# Patient Record
Sex: MALE | Race: WHITE | Employment: PART TIME | URBAN - METROPOLITAN AREA
[De-identification: names, ages, dates, MRNs, and addresses within clinical notes are randomized per-mention and may not be internally consistent; named-entity substitution may affect disease eponyms.]

---

## 2018-03-28 ENCOUNTER — OFFICE VISIT (OUTPATIENT)
Dept: FAMILY MEDICINE CLINIC | Age: 23
End: 2018-03-28
Payer: COMMERCIAL

## 2018-03-28 VITALS
TEMPERATURE: 98.8 F | OXYGEN SATURATION: 98 % | WEIGHT: 142 LBS | HEART RATE: 82 BPM | BODY MASS INDEX: 22.82 KG/M2 | HEIGHT: 66 IN | RESPIRATION RATE: 16 BRPM | SYSTOLIC BLOOD PRESSURE: 110 MMHG | DIASTOLIC BLOOD PRESSURE: 68 MMHG

## 2018-03-28 DIAGNOSIS — R11.2 NAUSEA VOMITING AND DIARRHEA: Primary | ICD-10-CM

## 2018-03-28 DIAGNOSIS — R19.7 NAUSEA VOMITING AND DIARRHEA: Primary | ICD-10-CM

## 2018-03-28 PROCEDURE — 99213 OFFICE O/P EST LOW 20 MIN: CPT | Performed by: PHYSICIAN ASSISTANT

## 2018-03-28 RX ORDER — ONDANSETRON 4 MG/1
4 TABLET, ORALLY DISINTEGRATING ORAL EVERY 8 HOURS PRN
Qty: 10 TABLET | Refills: 0 | Status: SHIPPED
Start: 2018-03-28 | End: 2021-06-09

## 2018-03-28 RX ORDER — OSELTAMIVIR PHOSPHATE 75 MG/1
75 CAPSULE ORAL 2 TIMES DAILY
Qty: 10 CAPSULE | Refills: 0 | Status: SHIPPED | OUTPATIENT
Start: 2018-03-28 | End: 2018-04-02

## 2021-06-09 ENCOUNTER — OFFICE VISIT (OUTPATIENT)
Dept: ORTHOPEDIC SURGERY | Age: 26
End: 2021-06-09
Payer: COMMERCIAL

## 2021-06-09 VITALS — BODY MASS INDEX: 24.91 KG/M2 | HEIGHT: 66 IN | WEIGHT: 155 LBS

## 2021-06-09 DIAGNOSIS — M25.561 RIGHT KNEE PAIN, UNSPECIFIED CHRONICITY: Primary | ICD-10-CM

## 2021-06-09 PROCEDURE — 99203 OFFICE O/P NEW LOW 30 MIN: CPT | Performed by: ORTHOPAEDIC SURGERY

## 2021-06-09 PROCEDURE — G8419 CALC BMI OUT NRM PARAM NOF/U: HCPCS | Performed by: ORTHOPAEDIC SURGERY

## 2021-06-09 PROCEDURE — G8428 CUR MEDS NOT DOCUMENT: HCPCS | Performed by: ORTHOPAEDIC SURGERY

## 2021-06-09 PROCEDURE — 1036F TOBACCO NON-USER: CPT | Performed by: ORTHOPAEDIC SURGERY

## 2021-06-10 NOTE — PROGRESS NOTES
Chief Complaint:   Chief Complaint   Patient presents with    Knee Pain     Right knee stiffness, some pain. States this knee has bothered him since he was in the 3rd grade on and off. When doing yard work knee will crack. He started weight training, bothers him squatting. Wears knee sleeve. No xrays. HPI 77-year-old male here for evaluation of somewhat chronic right knee anterior stiffness and pain. 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 He is primarily concerned because he recalls he was told as a child that he had a \"bone tumor\" about the right knee but no treatment was recommended. There is no problem list on file for this patient. Past Medical History:   Diagnosis Date    Asthma        Past Surgical History:   Procedure Laterality Date    WISDOM TOOTH EXTRACTION      x 4       Current Outpatient Medications   Medication Sig Dispense Refill    fluticasone (FLONASE) 50 MCG/ACT nasal spray 2 times daily.  PROAIR  (90 BASE) MCG/ACT inhaler INHALE TWO PUFFS BY MOUTH EVERY 4 HOURS AS NEEDED  0    QVAR 80 MCG/ACT inhaler INHALE 2 PUFFS 2 TIMES DAILY  3    montelukast (SINGULAIR) 10 MG tablet Take 10 mg by mouth nightly       FLUZONE QUADRIVALENT SUSP injection administer one injection as directed  0     No current facility-administered medications for this visit.        Allergies   Allergen Reactions    Shellfish-Derived Products Swelling       Social History     Socioeconomic History    Marital status: Single     Spouse name: None    Number of children: None    Years of education: None    Highest education level: None   Occupational History    None   Tobacco Use    Smoking status: Never Smoker    Smokeless tobacco: Never Used   Substance and Sexual Activity    Alcohol use: Yes     Comment: 1-2 months    Drug use: Never    Sexual activity: None   Other Topics Concern    None   Social History Narrative    None     Social Determinants of Health     Financial Resource Strain:     Difficulty of Paying Living Expenses:    Food Insecurity:     Worried About Running Out of Food in the Last Year:     [address] in the Last Year:    Transportation Needs:     Lack of Transportation (Medical):  Lack of Transportation (Non-Medical):    Physical Activity:     Days of Exercise per Week:     Minutes of Exercise per Session:    Stress:     Feeling of Stress :    Social Connections:     Frequency of Communication with Friends and Family:     Frequency of Social Gatherings with Friends and Family:     Attends [employer] Services:     Active Member of Clubs or Organizations:     Attends Club or Organization Meetings:     Marital Status:    Intimate Partner Violence:     Fear of Current or Ex-Partner:     Emotionally Abused:     Physically Abused:     Sexually Abused:        Family History   Problem Relation Age of Onset    Asthma Mother     Heart Attack Mother     Parkinsonism Father          Review of Systems   No fever, chills, or other constitutionalsymptoms. No numbness or other neuro symptoms. No chest pain. No dyspnea. [unfilled]   No acute distress. Walking without a limp. Right lower extremity exam demonstrates excellent VMO tone. No knee joint effusion. Slight tenderness palpation along the medial patellofemoral joint. There is full right knee range of motion with little discomfort. There is minimal discomfort and no laxity on varus valgus Lachman or drawer testing. There is no pain with full rotation of the right hip. Physical Exam    Patient is alert and oriented. Well-developed well-nourished. BMI 25. Pupils equal and reactive. Scleraeanicteric. Neck supple  Lungs clear. Cardiac rate and rhythm regular. Abdomen soft and nontender. Skin warm and dry. XRAY: X-ray today AP and lateral views right knee.   There are no bone lesions visible in the distal femur or proximal tibia. There is very subtle subchondral sclerosis possible at the patella but essentially well-preserved. Impression: Right knee x-rays negative for visible bone lesions. Perhaps very mild patellofemoral arthritic change. ASSESSMENT/PLAN:    [place] Lloyd was seen today for knee pain. Diagnoses and all orders for this visit:    Right knee pain, unspecified chronicity  -     Cancel: XR KNEE LEFT (1-2 VIEWS); Future  -     XR KNEE RIGHT (1-2 VIEWS); Future    Findings and images reviewed with the patient. The examination and x-ray of his joint demonstrate appears to be in good condition. I do not see any residual bone lesions. If he had a common juvenile lesion such as a nonossifying fibroma it has completely resolved as would be expected. I discussed with him management of anterior knee pain including appropriate exercise to avoid loads with deep squats but to do range of motion and short arc exercises. He is reassured by these findings and will follow up if any further concerns. Return if symptoms worsen or fail to improve.        German Venegas MD    6/10/2021  7:53 AM